# Patient Record
(demographics unavailable — no encounter records)

---

## 2024-11-05 NOTE — PHYSICAL EXAM
[No Acute Distress] : no acute distress [Normal Sclera/Conjunctiva] : normal sclera/conjunctiva [PERRL] : pupils equal round and reactive to light [EOMI] : extraocular movements intact [Normal Outer Ear/Nose] : the outer ears and nose were normal in appearance [Normal Oropharynx] : the oropharynx was normal [No JVD] : no jugular venous distention [No Lymphadenopathy] : no lymphadenopathy [Supple] : supple [Thyroid Normal, No Nodules] : the thyroid was normal and there were no nodules present [No Respiratory Distress] : no respiratory distress  [No Accessory Muscle Use] : no accessory muscle use [Clear to Auscultation] : lungs were clear to auscultation bilaterally [Normal Rate] : normal rate  [Regular Rhythm] : with a regular rhythm [Normal S1, S2] : normal S1 and S2 [No Murmur] : no murmur heard [No Carotid Bruits] : no carotid bruits [No Abdominal Bruit] : a ~M bruit was not heard ~T in the abdomen [No Varicosities] : no varicosities [Pedal Pulses Present] : the pedal pulses are present [No Edema] : there was no peripheral edema [No Palpable Aorta] : no palpable aorta [No Extremity Clubbing/Cyanosis] : no extremity clubbing/cyanosis [Soft] : abdomen soft [Non Tender] : non-tender [Non-distended] : non-distended [No Masses] : no abdominal mass palpated [No HSM] : no HSM [Normal Bowel Sounds] : normal bowel sounds [Normal Posterior Cervical Nodes] : no posterior cervical lymphadenopathy [Normal Anterior Cervical Nodes] : no anterior cervical lymphadenopathy [No CVA Tenderness] : no CVA  tenderness [No Spinal Tenderness] : no spinal tenderness [Grossly Normal Strength/Tone] : grossly normal strength/tone [No Rash] : no rash [Coordination Grossly Intact] : coordination grossly intact [No Focal Deficits] : no focal deficits [Normal Gait] : normal gait [Deep Tendon Reflexes (DTR)] : deep tendon reflexes were 2+ and symmetric [Normal Affect] : the affect was normal [Normal Insight/Judgement] : insight and judgment were intact [de-identified] : L knee - swelling present, some tenderness, no redness.

## 2024-11-05 NOTE — HEALTH RISK ASSESSMENT
[Fair] :  ~his/her~ mood as fair [0] : 2) Feeling down, depressed, or hopeless: Not at all (0) [Former] : Former [20 or more] : 20 or more [> 15 Years] : > 15 Years [[id number]] : 0

## 2024-11-05 NOTE — ASSESSMENT
[FreeTextEntry1] : 93 yrs old F with pmx of HTN, HLD, osteoporosis, chronic L Knee pain comes in to establish care.  1. chronic L knee pain:  Has chronic L knee pain for many years now, gets steroid injections from orthopedics at [facility], has some chronic swelling, but no redness, no fevers.  No other complains.  Plan: diclofenac 3% gel topical BID fu with orthopedics  2. vasovagal syncope:   Denies any chest pain, sob, palpitations, cough, fevers, abd pain, vomiting, diarrhea, rash.  No more dizziness episodes, since her admission to [facility] in 10/2024, likely vasovagal, denies any prior chest pain, sob, palpitations, no seizure activity, CT brain- wnl.  No other new complains.   Plan: advised to stay hydrated advised to get up slowly monitor  3. HTN: BP at goal of <140/90 taking losartan 50 mg OD  Plan: cont same DASH diet BP log at home RTC in 3 months  4. HLD:  10/2024- not sure if that was fasting  Plan: taking atorvastatin 10 mg OD RTC in 2-3 months advised on diet.

## 2024-11-05 NOTE — HISTORY OF PRESENT ILLNESS
[FreeTextEntry1] : chronic L knee pain [de-identified] : 93 yrs old F with pmx of HTN, HLD, osteoporosis, chronic L Knee pain comes in to establish care. Has chronic L knee pain for many years now, gets steroid injections from orthopedics at [facility], has some chronic swelling, but no redness, no fevers.  No other complains.  Denies any chest pain, sob, palpitations, cough, fevers, abd pain, vomiting, diarrhea, rash.  No more dizziness episodes, since her admission to [facility] in 10/2024, likely vasovagal, denies any prior chest pain, sob, palpitations, no seizure activity, CT brain- wnl.   No other new complains.